# Patient Record
Sex: FEMALE | Race: WHITE | Employment: UNEMPLOYED | URBAN - METROPOLITAN AREA
[De-identification: names, ages, dates, MRNs, and addresses within clinical notes are randomized per-mention and may not be internally consistent; named-entity substitution may affect disease eponyms.]

---

## 2019-04-08 ENCOUNTER — TELEPHONE (OUTPATIENT)
Dept: ORTHOPEDICS | Facility: CLINIC | Age: 64
End: 2019-04-08

## 2019-04-08 NOTE — TELEPHONE ENCOUNTER
Surgery is not scheduled with any of our orthopedic providers.  Will need clarification.   Message left on patient's voicemail to call the clinic.

## 2019-04-08 NOTE — TELEPHONE ENCOUNTER
[facility] Call Center    Phone Message    May a detailed message be left on voicemail: yes    Reason for Call: Other: Pt states she has surgery coming up (don't see it on her chart) for carpal tunnel/nerve damage to R hand. She didn't know which doctor it was with, has received no information on it yet.     Action Taken: Message routed to:  [facility] ([facility] ORtho